# Patient Record
Sex: FEMALE | Race: WHITE | Employment: OTHER | URBAN - METROPOLITAN AREA
[De-identification: names, ages, dates, MRNs, and addresses within clinical notes are randomized per-mention and may not be internally consistent; named-entity substitution may affect disease eponyms.]

---

## 2017-02-14 ENCOUNTER — TELEPHONE (OUTPATIENT)
Dept: SURGERY | Age: 57
End: 2017-02-14

## 2017-02-14 NOTE — TELEPHONE ENCOUNTER
Per [facility] 30 day post surgical requirements:  Phone call placed. Spoke to patient. Patient denies any readmissions or complications within the 30 day post operative period.

## 2017-02-15 ENCOUNTER — OFFICE VISIT (OUTPATIENT)
Dept: SURGERY | Age: 57
End: 2017-02-15

## 2017-02-15 VITALS
HEART RATE: 68 BPM | HEIGHT: 60 IN | RESPIRATION RATE: 16 BRPM | WEIGHT: 142 LBS | OXYGEN SATURATION: 98 % | DIASTOLIC BLOOD PRESSURE: 96 MMHG | SYSTOLIC BLOOD PRESSURE: 141 MMHG | BODY MASS INDEX: 27.88 KG/M2

## 2017-02-15 DIAGNOSIS — Z98.84 S/P BARIATRIC SURGERY: Primary | ICD-10-CM

## 2017-02-15 NOTE — MR AVS SNAPSHOT
Visit Information Date & Time Provider Department Dept. Phone Encounter #  
 2/15/2017 11:30 AM Helen Goldman [facility] [place] [phone] Upcoming Health Maintenance Date Due Hepatitis C Screening 1960 Pneumococcal 19-64 Medium Risk (1 of 1 - PPSV23) 12/17/1980 DTaP/Tdap/Td series (1 - Tdap) 12/17/1982 PAP AKA CERVICAL CYTOLOGY 12/17/1982 BREAST CANCER SCRN MAMMOGRAM 12/17/2011 FOBT Q 1 YEAR AGE 50-75 12/17/2011 Allergies as of 2/15/2017  Review Complete On: 2/15/2017 By: Mike Ritchie MD  
  
 Severity Noted Reaction Type Reactions Claritin [Loratadine]  07/31/2014    Other (comments)  
 dizzy Codeine  07/31/2014    Nausea Only Current Immunizations  Reviewed on 12/4/2016 Name Date Influenza Vaccine 9/1/2016 Not reviewed this visit Vitals BP Pulse Resp Height(growth percentile) Weight(growth percentile) LMP  
 (!) 141/96 (BP 1 Location: Left arm, BP Patient Position: Sitting) 68 16 5' (1.524 m) 142 lb (64.4 kg) 06/09/2014 SpO2 BMI OB Status Smoking Status 98% 27.73 kg/m2 Postmenopausal Never Smoker Vitals History BMI and BSA Data Body Mass Index Body Surface Area  
 27.73 kg/m 2 1.65 m 2 Your Updated Medication List  
  
   
This list is accurate as of: 2/15/17 11:53 AM.  Always use your most recent med list. ALLEGRA 180 mg tablet Generic drug:  fexofenadine Take  by mouth daily. B COMPLEX PO Take  by mouth. BIOTIN PO Take  by mouth. CALCIUM CARBONATE PO Take  by mouth. COUMADIN PO Take  by mouth. DUONEB 2.5 mg-0.5 mg/3 ml Nebu Generic drug:  albuterol-ipratropium 3 mL by Nebulization route once. FISH OIL PO Take  by mouth. FLONASE 50 mcg/actuation nasal spray Generic drug:  fluticasone  
nightly. FOSAMAX 70 mg tablet Generic drug:  alendronate Take  by mouth. LASIX PO Take  by mouth. METAMUCIL PO Take  by mouth.  
  
 midodrine 5 mg tablet Commonly known as:  Yosef Abo Take 5 mg by mouth daily. Takes in the morning. MIRALAX PO Take  by mouth.  
  
 multivitamin tablet Commonly known as:  ONE A DAY Take 1 Tab by mouth daily. OXYGEN-AIR DELIVERY SYSTEMS  
by Does Not Apply route. peg 400-propylene glycol 0.4-0.3 % Drop Commonly known as:  SYSTANE Administer  to left eye as needed. POTASSIUM CHLORIDE PO Take  by mouth.  
  
 predniSONE 10 mg tablet Commonly known as:  Juanis Tammy Take  by mouth daily (with breakfast). PULMICORT 0.5 mg/2 mL Nbsp Generic drug:  budesonide 500 mcg by Nebulization route two (2) times a day. THEOPHYLLINE PO Take  by mouth. traMADol 50 mg tablet Commonly known as:  ULTRAM  
Take 50 mg by mouth every four (4) hours as needed for Pain. ZAROXOLYN 2.5 mg tablet Generic drug:  metOLazone Take  by mouth daily. Patient Instructions Body Mass Index: Care Instructions Your Care Instructions Body mass index (BMI) can help you see if your weight is raising your risk for health problems. It uses a formula to compare how much you weigh with how tall you are. A BMI between 18.5 and 24.9 is considered healthy. A BMI between 25 and 29.9 is considered overweight. A BMI of 30 or higher is considered obese. If your BMI is in the normal range, it means that you have a lower risk for weight-related health problems. If your BMI is in the overweight or obese range, you may be at increased risk for weight-related health problems, such as high blood pressure, heart disease, stroke, arthritis or joint pain, and diabetes. BMI is just one measure of your risk for weight-related health problems.  You may be at higher risk for health problems if you are not active, you eat an unhealthy diet, or you drink too much alcohol or use tobacco products. Follow-up care is a key part of your treatment and safety. Be sure to make and go to all appointments, and call your doctor if you are having problems. It's also a good idea to know your test results and keep a list of the medicines you take. How can you care for yourself at home? · Practice healthy eating habits. This includes eating plenty of fruits, vegetables, whole grains, lean protein, and low-fat dairy. · Get at least 30 minutes of exercise 5 days a week or more. Brisk walking is a good choice. You also may want to do other activities, such as running, swimming, cycling, or playing tennis or team sports. · Do not smoke. Smoking can increase your risk for health problems. If you need help quitting, talk to your doctor about stop-smoking programs and medicines. These can increase your chances of quitting for good. · Limit alcohol to 2 drinks a day for men and 1 drink a day for women. Too much alcohol can cause health problems. If you have a BMI higher than 25 · Your doctor may do other tests to check your risk for weight-related health problems. This may include measuring the distance around your waist. A waist measurement of more than 40 inches in men or 35 inches in women can increase the risk of weight-related health problems. · Talk with your doctor about steps you can take to stay healthy or improve your health. You may need to make lifestyle changes to lose weight and stay healthy, such as changing your diet and getting regular exercise. Where can you learn more? Go to http://miguel-tiffany.info/. Enter S176 in the search box to learn more about \"Body Mass Index: Care Instructions. \" Current as of: February 16, 2016 Content Version: 11.1 © 5411-7340 [employer]. Care instructions adapted under license by [employer] (which disclaims liability or warranty for this information).  If you have questions about a medical condition or this instruction, always ask your healthcare professional. [address] any warranty or liability for your use of this information. Introducing [facility]! Dear Beth Iniguez: Thank you for requesting a Springbot account. Our records indicate that you already have an active Springbot account. You can access your account anytime at [email or web address]. [employer]otiant/[email or web address] After Fifty Did you know that you can access your hospital and ER discharge instructions at any time in Springbot? You can also review all of your test results from your hospital stay or ER visit. Additional Information If you have questions, please visit the Frequently Asked Questions section of the Springbot website at [email or web address]. [employer]/[email or web address] After [email or web address]/. Remember, Springbot is NOT to be used for urgent needs. For medical emergencies, dial 911. Now available from your iPhone and Android! Please provide this summary of care documentation to your next provider. Your primary care clinician is listed as Fox Maxwell. If you have any questions after today's visit, please call [phone].

## 2017-02-15 NOTE — PROGRESS NOTES
Subjective:     Pooja Gayle  is a 55 y.o. female who presents for follow-up about 2 months following Lap band removal..  Body mass index is 27.73 kg/(m^2). .    Surgery related complication: none       She reports pain at port site only and denies vomiting. Patients pain score:0      The patient's exercise level: moderately active. Changes in her medical history and medications have been reviewed. Patient Active Problem List   Diagnosis Code    Morbid obesity ([address] E66.01    Unspecified sleep apnea G47.30    Female stress incontinence N39.3    Asthma J45.909    Small bowel obstruction ([address] K56.69    Acute on chronic respiratory failure with hypoxia ([address] J96.21     Past Medical History   Diagnosis Date    Asthma 11/7/2011    Female stress incontinence 11/7/2011    Morbid obesity ([facility] [address] 11/7/2011    Unspecified sleep apnea 11/7/2011     Past Surgical History   Procedure Laterality Date    Pr lap, place adjust gastr band  6/12/2008    Pr tracheostomy, planned  1987     1)emergency   2)permanent trach    Pr neurological procedure unlisted  1987     brain stem relocation    Hx hernia repair      Hx gi  12/2016     removal of lap band and port     Current Outpatient Prescriptions   Medication Sig Dispense Refill    budesonide (PULMICORT) 0.5 mg/2 mL nbsp 500 mcg by Nebulization route two (2) times a day.  fluticasone (FLONASE) 50 mcg/actuation nasal spray nightly.  fexofenadine (ALLEGRA) 180 mg tablet Take  by mouth daily.  CALCIUM CARBONATE PO Take  by mouth.  BIOTIN PO Take  by mouth.  multivitamin (ONE A DAY) tablet Take 1 Tab by mouth daily.  VITAMIN B COMPLEX (B COMPLEX PO) Take  by mouth.  PSYLLIUM SEED, WITH SUGAR, (METAMUCIL PO) Take  by mouth.  POLYETHYLENE GLYCOL 3350 (MIRALAX PO) Take  by mouth.  peg 400-propylene glycol (SYSTANE) 0.4-0.3 % drop Administer  to left eye as needed.  OXYGEN-AIR DELIVERY SYSTEMS by Does Not Apply route.  THEOPHYLLINE ANHYDROUS (THEOPHYLLINE PO) Take  by mouth.  WARFARIN SODIUM (COUMADIN PO) Take  by mouth.  midodrine (PROAMITINE) 5 mg tablet Take 5 mg by mouth daily. Takes in the morning.  alendronate (FOSAMAX) 70 mg tablet Take  by mouth.  traMADol (ULTRAM) 50 mg tablet Take 50 mg by mouth every four (4) hours as needed for Pain.  DOCOSAHEXANOIC ACID/EPA (FISH OIL PO) Take  by mouth.  POTASSIUM CHLORIDE PO Take  by mouth.  metolazone (ZAROXOLYN) 2.5 mg tablet Take  by mouth daily.  FUROSEMIDE (LASIX PO) Take  by mouth.  predniSONE (DELTASONE) 10 mg tablet Take  by mouth daily (with breakfast).  albuterol-ipratropium (DUONEB) 0.5 mg-3 mg(2.5 mg base)/3 mL nebulizer solution 3 mL by Nebulization route once. Objective:     Visit Vitals    BP (!) 141/96 (BP 1 Location: Left arm, BP Patient Position: Sitting)    Pulse 68    Resp 16    Ht 5' (1.524 m)    Wt 64.4 kg (142 lb)    LMP 06/09/2014    SpO2 98%    BMI 27.73 kg/m2        Physical Exam:    General:  alert, cooperative, no distress, appears stated age   Lungs:   clear to auscultation bilaterally   Heart:  Regular rate and rhythm   Abdomen:   abdomen is soft without significant tenderness, masses, organomegaly or guarding; Incisions: healing well, port in place         Assessment:     1. History of Morbid obesity, status post  lap band removal. Will need port to be removed. Plan:     1. Remember to measure portions, continue low carbohydrate diet  2. Tolerated transition to solids without issue  3. Remember vitamin supplements. 4. Exercise regimen appears adequate. 5. Attend support group. 6. Follow-up 6 months. 7. Total time spent with the patient 20 minutes.   8. Will schedule for lap band port removal.

## 2017-02-15 NOTE — PATIENT INSTRUCTIONS
Body Mass Index: Care Instructions  Your Care Instructions    Body mass index (BMI) can help you see if your weight is raising your risk for health problems. It uses a formula to compare how much you weigh with how tall you are. A BMI between 18.5 and 24.9 is considered healthy. A BMI between 25 and 29.9 is considered overweight. A BMI of 30 or higher is considered obese. If your BMI is in the normal range, it means that you have a lower risk for weight-related health problems. If your BMI is in the overweight or obese range, you may be at increased risk for weight-related health problems, such as high blood pressure, heart disease, stroke, arthritis or joint pain, and diabetes. BMI is just one measure of your risk for weight-related health problems. You may be at higher risk for health problems if you are not active, you eat an unhealthy diet, or you drink too much alcohol or use tobacco products. Follow-up care is a key part of your treatment and safety. Be sure to make and go to all appointments, and call your doctor if you are having problems. It's also a good idea to know your test results and keep a list of the medicines you take. How can you care for yourself at home? · Practice healthy eating habits. This includes eating plenty of fruits, vegetables, whole grains, lean protein, and low-fat dairy. · Get at least 30 minutes of exercise 5 days a week or more. Brisk walking is a good choice. You also may want to do other activities, such as running, swimming, cycling, or playing tennis or team sports. · Do not smoke. Smoking can increase your risk for health problems. If you need help quitting, talk to your doctor about stop-smoking programs and medicines. These can increase your chances of quitting for good. · Limit alcohol to 2 drinks a day for men and 1 drink a day for women. Too much alcohol can cause health problems.   If you have a BMI higher than 25  · Your doctor may do other tests to check your risk for weight-related health problems. This may include measuring the distance around your waist. A waist measurement of more than 40 inches in men or 35 inches in women can increase the risk of weight-related health problems. · Talk with your doctor about steps you can take to stay healthy or improve your health. You may need to make lifestyle changes to lose weight and stay healthy, such as changing your diet and getting regular exercise. Where can you learn more? Go to http://miguel-tiffany.info/. Enter S176 in the search box to learn more about \"Body Mass Index: Care Instructions. \"  Current as of: February 16, 2016  Content Version: 11.1  © 4946-6665 [employer]. Care instructions adapted under license by [employer] (which disclaims liability or warranty for this information). If you have questions about a medical condition or this instruction, always ask your healthcare professional. [address] any warranty or liability for your use of this information.

## 2017-03-10 DIAGNOSIS — Z98.84 BARIATRIC SURGERY STATUS: ICD-10-CM

## 2017-03-10 DIAGNOSIS — T85.518A: Primary | ICD-10-CM

## 2017-03-10 DIAGNOSIS — Z01.812 BLOOD TESTS PRIOR TO TREATMENT OR PROCEDURE: ICD-10-CM

## 2017-03-28 ENCOUNTER — HOSPITAL ENCOUNTER (OUTPATIENT)
Dept: PREADMISSION TESTING | Age: 57
Discharge: HOME | End: 2017-03-28
Attending: SPECIALIST
Payer: COMMERCIAL

## 2017-03-28 DIAGNOSIS — Z98.84 BARIATRIC SURGERY STATUS: ICD-10-CM

## 2017-03-28 DIAGNOSIS — Z01.812 BLOOD TESTS PRIOR TO TREATMENT OR PROCEDURE: ICD-10-CM

## 2017-03-28 DIAGNOSIS — T85.518A: ICD-10-CM

## 2017-03-28 LAB
ALBUMIN SERPL BCP-MCNC: 3.4 G/DL (ref 3.4–5)
ALBUMIN/GLOB SERPL: 1 {RATIO} (ref 0.8–1.7)
ALP SERPL-CCNC: 48 U/L (ref 45–117)
ALT SERPL-CCNC: 37 U/L (ref 13–56)
ANION GAP BLD CALC-SCNC: 5 MMOL/L (ref 3–18)
AST SERPL W P-5'-P-CCNC: 25 U/L (ref 15–37)
BASOPHILS # BLD AUTO: 0.1 K/UL (ref 0–0.06)
BASOPHILS # BLD: 1 % (ref 0–2)
BILIRUB SERPL-MCNC: 0.3 MG/DL (ref 0.2–1)
BUN SERPL-MCNC: 23 MG/DL (ref 7–18)
BUN/CREAT SERPL: 37 (ref 12–20)
CALCIUM SERPL-MCNC: 9.2 MG/DL (ref 8.5–10.1)
CHLORIDE SERPL-SCNC: 107 MMOL/L (ref 100–108)
CO2 SERPL-SCNC: 33 MMOL/L (ref 21–32)
CREAT SERPL-MCNC: 0.63 MG/DL (ref 0.6–1.3)
DIFFERENTIAL METHOD BLD: ABNORMAL
EOSINOPHIL # BLD: 0.4 K/UL (ref 0–0.4)
EOSINOPHIL NFR BLD: 7 % (ref 0–5)
ERYTHROCYTE [DISTWIDTH] IN BLOOD BY AUTOMATED COUNT: 14.9 % (ref 11.6–14.5)
GLOBULIN SER CALC-MCNC: 3.3 G/DL (ref 2–4)
GLUCOSE SERPL-MCNC: 86 MG/DL (ref 74–99)
HCT VFR BLD AUTO: 40.6 % (ref 35–45)
HGB BLD-MCNC: 13.2 G/DL (ref 12–16)
LYMPHOCYTES # BLD AUTO: 21 % (ref 21–52)
LYMPHOCYTES # BLD: 1.3 K/UL (ref 0.9–3.6)
MCH RBC QN AUTO: 29.1 PG (ref 24–34)
MCHC RBC AUTO-ENTMCNC: 32.5 G/DL (ref 31–37)
MCV RBC AUTO: 89.6 FL (ref 74–97)
MONOCYTES # BLD: 0.5 K/UL (ref 0.05–1.2)
MONOCYTES NFR BLD AUTO: 7 % (ref 3–10)
NEUTS SEG # BLD: 4 K/UL (ref 1.8–8)
NEUTS SEG NFR BLD AUTO: 64 % (ref 40–73)
PLATELET # BLD AUTO: 239 K/UL (ref 135–420)
PMV BLD AUTO: 10.4 FL (ref 9.2–11.8)
POTASSIUM SERPL-SCNC: 4.2 MMOL/L (ref 3.5–5.5)
PROT SERPL-MCNC: 6.7 G/DL (ref 6.4–8.2)
RBC # BLD AUTO: 4.53 M/UL (ref 4.2–5.3)
SODIUM SERPL-SCNC: 145 MMOL/L (ref 136–145)
WBC # BLD AUTO: 6.2 K/UL (ref 4.6–13.2)

## 2017-03-28 PROCEDURE — 80053 COMPREHEN METABOLIC PANEL: CPT | Performed by: SPECIALIST

## 2017-03-28 PROCEDURE — 93005 ELECTROCARDIOGRAM TRACING: CPT

## 2017-03-28 PROCEDURE — 85025 COMPLETE CBC W/AUTO DIFF WBC: CPT | Performed by: SPECIALIST

## 2017-03-28 PROCEDURE — 36415 COLL VENOUS BLD VENIPUNCTURE: CPT | Performed by: SPECIALIST

## 2017-03-29 LAB
ATRIAL RATE: 65 BPM
CALCULATED P AXIS, ECG09: 72 DEGREES
CALCULATED R AXIS, ECG10: 50 DEGREES
CALCULATED T AXIS, ECG11: 55 DEGREES
DIAGNOSIS, 93000: NORMAL
P-R INTERVAL, ECG05: 144 MS
Q-T INTERVAL, ECG07: 434 MS
QRS DURATION, ECG06: 138 MS
QTC CALCULATION (BEZET), ECG08: 451 MS
VENTRICULAR RATE, ECG03: 65 BPM

## 2017-04-21 ENCOUNTER — ANESTHESIA (OUTPATIENT)
Dept: SURGERY | Age: 57
End: 2017-04-21
Payer: COMMERCIAL

## 2017-04-21 ENCOUNTER — ANESTHESIA EVENT (OUTPATIENT)
Dept: SURGERY | Age: 57
End: 2017-04-21
Payer: COMMERCIAL

## 2017-04-21 ENCOUNTER — HOSPITAL ENCOUNTER (OUTPATIENT)
Age: 57
Setting detail: OUTPATIENT SURGERY
Discharge: HOME | End: 2017-04-21
Attending: SPECIALIST | Admitting: SPECIALIST
Payer: COMMERCIAL

## 2017-04-21 VITALS
TEMPERATURE: 97.8 F | DIASTOLIC BLOOD PRESSURE: 90 MMHG | HEIGHT: 60 IN | RESPIRATION RATE: 14 BRPM | BODY MASS INDEX: 28.07 KG/M2 | WEIGHT: 143 LBS | OXYGEN SATURATION: 96 % | SYSTOLIC BLOOD PRESSURE: 161 MMHG | HEART RATE: 76 BPM

## 2017-04-21 LAB
INR PPP: 1.1 (ref 0.8–1.2)
PROTHROMBIN TIME: 13.7 SEC (ref 11.5–15.2)

## 2017-04-21 PROCEDURE — 85610 PROTHROMBIN TIME: CPT | Performed by: ANESTHESIOLOGY

## 2017-04-21 PROCEDURE — 74011250636 HC RX REV CODE- 250/636: Performed by: SPECIALIST

## 2017-04-21 PROCEDURE — 77030032490 HC SLV COMPR SCD KNE COVD -B: Performed by: SPECIALIST

## 2017-04-21 PROCEDURE — 74011000250 HC RX REV CODE- 250

## 2017-04-21 PROCEDURE — 77030011640 HC PAD GRND REM COVD -A: Performed by: SPECIALIST

## 2017-04-21 PROCEDURE — 77030020782 HC GWN BAIR PAWS FLX 3M -B: Performed by: SPECIALIST

## 2017-04-21 PROCEDURE — 74011250636 HC RX REV CODE- 250/636

## 2017-04-21 PROCEDURE — 88300 SURGICAL PATH GROSS: CPT | Performed by: SPECIALIST

## 2017-04-21 PROCEDURE — 76210000021 HC REC RM PH II 0.5 TO 1 HR: Performed by: SPECIALIST

## 2017-04-21 PROCEDURE — 76060000032 HC ANESTHESIA 0.5 TO 1 HR: Performed by: SPECIALIST

## 2017-04-21 PROCEDURE — 77030018836 HC SOL IRR NACL ICUM -A: Performed by: SPECIALIST

## 2017-04-21 PROCEDURE — 77030002935 HC SUT MCRYL J&J -C: Performed by: SPECIALIST

## 2017-04-21 PROCEDURE — 76010000138 HC OR TIME 0.5 TO 1 HR: Performed by: SPECIALIST

## 2017-04-21 PROCEDURE — 74011000250 HC RX REV CODE- 250: Performed by: SPECIALIST

## 2017-04-21 PROCEDURE — 36415 COLL VENOUS BLD VENIPUNCTURE: CPT | Performed by: ANESTHESIOLOGY

## 2017-04-21 RX ORDER — MIDAZOLAM HYDROCHLORIDE 1 MG/ML
INJECTION, SOLUTION INTRAMUSCULAR; INTRAVENOUS AS NEEDED
Status: DISCONTINUED | OUTPATIENT
Start: 2017-04-21 | End: 2017-04-21 | Stop reason: HOSPADM

## 2017-04-21 RX ORDER — SODIUM CHLORIDE, SODIUM LACTATE, POTASSIUM CHLORIDE, CALCIUM CHLORIDE 600; 310; 30; 20 MG/100ML; MG/100ML; MG/100ML; MG/100ML
125 INJECTION, SOLUTION INTRAVENOUS CONTINUOUS
Status: DISCONTINUED | OUTPATIENT
Start: 2017-04-21 | End: 2017-04-21 | Stop reason: HOSPADM

## 2017-04-21 RX ORDER — NALOXONE HYDROCHLORIDE 0.4 MG/ML
0.1 INJECTION, SOLUTION INTRAMUSCULAR; INTRAVENOUS; SUBCUTANEOUS AS NEEDED
Status: DISCONTINUED | OUTPATIENT
Start: 2017-04-21 | End: 2017-04-21 | Stop reason: HOSPADM

## 2017-04-21 RX ORDER — SODIUM CHLORIDE, SODIUM LACTATE, POTASSIUM CHLORIDE, CALCIUM CHLORIDE 600; 310; 30; 20 MG/100ML; MG/100ML; MG/100ML; MG/100ML
100 INJECTION, SOLUTION INTRAVENOUS CONTINUOUS
Status: DISCONTINUED | OUTPATIENT
Start: 2017-04-21 | End: 2017-04-21

## 2017-04-21 RX ORDER — FLUMAZENIL 0.1 MG/ML
0.2 INJECTION INTRAVENOUS
Status: DISCONTINUED | OUTPATIENT
Start: 2017-04-21 | End: 2017-04-21 | Stop reason: HOSPADM

## 2017-04-21 RX ORDER — LIDOCAINE HYDROCHLORIDE 20 MG/ML
INJECTION, SOLUTION EPIDURAL; INFILTRATION; INTRACAUDAL; PERINEURAL AS NEEDED
Status: DISCONTINUED | OUTPATIENT
Start: 2017-04-21 | End: 2017-04-21 | Stop reason: HOSPADM

## 2017-04-21 RX ORDER — MAGNESIUM SULFATE 100 %
4 CRYSTALS MISCELLANEOUS AS NEEDED
Status: DISCONTINUED | OUTPATIENT
Start: 2017-04-21 | End: 2017-04-21 | Stop reason: HOSPADM

## 2017-04-21 RX ORDER — HYDROMORPHONE HYDROCHLORIDE 1 MG/ML
0.5 INJECTION, SOLUTION INTRAMUSCULAR; INTRAVENOUS; SUBCUTANEOUS
Status: DISCONTINUED | OUTPATIENT
Start: 2017-04-21 | End: 2017-04-21 | Stop reason: HOSPADM

## 2017-04-21 RX ORDER — FENTANYL CITRATE 50 UG/ML
INJECTION, SOLUTION INTRAMUSCULAR; INTRAVENOUS AS NEEDED
Status: DISCONTINUED | OUTPATIENT
Start: 2017-04-21 | End: 2017-04-21 | Stop reason: HOSPADM

## 2017-04-21 RX ORDER — OXYCODONE AND ACETAMINOPHEN 5; 325 MG/1; MG/1
1 TABLET ORAL
Qty: 10 TAB | Refills: 0 | Status: SHIPPED | OUTPATIENT
Start: 2017-04-21 | End: 2017-05-05

## 2017-04-21 RX ORDER — SODIUM CHLORIDE, SODIUM LACTATE, POTASSIUM CHLORIDE, CALCIUM CHLORIDE 600; 310; 30; 20 MG/100ML; MG/100ML; MG/100ML; MG/100ML
1000 INJECTION, SOLUTION INTRAVENOUS CONTINUOUS
Status: DISCONTINUED | OUTPATIENT
Start: 2017-04-21 | End: 2017-04-21 | Stop reason: HOSPADM

## 2017-04-21 RX ORDER — DEXTROSE 50 % IN WATER (D50W) INTRAVENOUS SYRINGE
25-50 AS NEEDED
Status: DISCONTINUED | OUTPATIENT
Start: 2017-04-21 | End: 2017-04-21 | Stop reason: HOSPADM

## 2017-04-21 RX ORDER — INSULIN LISPRO 100 [IU]/ML
INJECTION, SOLUTION INTRAVENOUS; SUBCUTANEOUS ONCE
Status: DISCONTINUED | OUTPATIENT
Start: 2017-04-21 | End: 2017-04-21 | Stop reason: HOSPADM

## 2017-04-21 RX ORDER — CEFAZOLIN SODIUM IN 0.9 % NACL 2 G/100 ML
2 PLASTIC BAG, INJECTION (ML) INTRAVENOUS ONCE
Status: COMPLETED | OUTPATIENT
Start: 2017-04-21 | End: 2017-04-21

## 2017-04-21 RX ORDER — ACETAMINOPHEN 10 MG/ML
1000 INJECTION, SOLUTION INTRAVENOUS ONCE
Status: COMPLETED | OUTPATIENT
Start: 2017-04-21 | End: 2017-04-21

## 2017-04-21 RX ORDER — SODIUM CHLORIDE 0.9 % (FLUSH) 0.9 %
5-10 SYRINGE (ML) INJECTION AS NEEDED
Status: DISCONTINUED | OUTPATIENT
Start: 2017-04-21 | End: 2017-04-21 | Stop reason: HOSPADM

## 2017-04-21 RX ORDER — PROPOFOL 10 MG/ML
INJECTION, EMULSION INTRAVENOUS AS NEEDED
Status: DISCONTINUED | OUTPATIENT
Start: 2017-04-21 | End: 2017-04-21 | Stop reason: HOSPADM

## 2017-04-21 RX ADMIN — PROPOFOL 10 MG: 10 INJECTION, EMULSION INTRAVENOUS at 10:55

## 2017-04-21 RX ADMIN — MIDAZOLAM HYDROCHLORIDE 2 MG: 1 INJECTION, SOLUTION INTRAMUSCULAR; INTRAVENOUS at 10:38

## 2017-04-21 RX ADMIN — FENTANYL CITRATE 100 MCG: 50 INJECTION, SOLUTION INTRAMUSCULAR; INTRAVENOUS at 10:38

## 2017-04-21 RX ADMIN — PROPOFOL 10 MG: 10 INJECTION, EMULSION INTRAVENOUS at 11:04

## 2017-04-21 RX ADMIN — PROPOFOL 10 MG: 10 INJECTION, EMULSION INTRAVENOUS at 10:51

## 2017-04-21 RX ADMIN — CEFAZOLIN 2 G: 10 INJECTION, POWDER, FOR SOLUTION INTRAVENOUS; PARENTERAL at 10:38

## 2017-04-21 RX ADMIN — SODIUM CHLORIDE, SODIUM LACTATE, POTASSIUM CHLORIDE, AND CALCIUM CHLORIDE 125 ML/HR: 600; 310; 30; 20 INJECTION, SOLUTION INTRAVENOUS at 09:12

## 2017-04-21 RX ADMIN — LIDOCAINE HYDROCHLORIDE 60 MG: 20 INJECTION, SOLUTION EPIDURAL; INFILTRATION; INTRACAUDAL; PERINEURAL at 10:51

## 2017-04-21 RX ADMIN — ACETAMINOPHEN 1000 MG: 10 INJECTION, SOLUTION INTRAVENOUS at 10:48

## 2017-04-21 NOTE — ANESTHESIA PREPROCEDURE EVALUATION
Anesthetic History   No history of anesthetic complications            Review of Systems / Medical History  Patient summary reviewed, nursing notes reviewed and pertinent labs reviewed    Pulmonary    COPD: severe    Sleep apnea: CPAP        Comments: Patient with permanent trach #4 shiley cuffed but deflated. She said it has a slow leak. She uses vent and 2 liters of o2 at night.     Neuro/Psych   Within defined limits           Cardiovascular    Hypertension              Exercise tolerance: >4 METS     GI/Hepatic/Renal  Within defined limits              Endo/Other  Within defined limits      Obesity and morbid obesity     Other Findings              Physical Exam    Airway  Mallampati: II  TM Distance: 4 - 6 cm  Neck ROM: normal range of motion   Mouth opening: Normal  Tracheostomy present   Cardiovascular               Dental  No notable dental hx    Comments: overbite   Pulmonary                 Abdominal  GI exam deferred       Other Findings            Anesthetic Plan    ASA: 3  Anesthesia type: MAC          Induction: Intravenous  Anesthetic plan and risks discussed with: Patient

## 2017-04-21 NOTE — DISCHARGE INSTRUCTIONS
DISCHARGE SUMMARY from Nurse    The following personal items are in your possession at time of discharge:    Dental Appliances: None        Home Medications: None  Jewelry: None  Clothing: Undergarments, Sweater, Footwear, Shirt, Pants (in locker #11)  Other Valuables: Eyeglasses, [employer], [address], Cell Phone, Other (comment) (and hearing aids with Gilberto Forrest)             PATIENT INSTRUCTIONS:    After general anesthesia or intravenous sedation, for 24 hours or while taking prescription Narcotics:  · Limit your activities  · Do not drive and operate hazardous machinery  · Do not make important personal or business decisions  · Do  not drink alcoholic beverages  · If you have not urinated within 8 hours after discharge, please contact your surgeon on call. Report the following to your surgeon:  · Excessive pain, swelling, redness or odor of or around the surgical area  · Temperature over 100.5  · Nausea and vomiting lasting longer than 4 hours or if unable to take medications  · Any signs of decreased circulation or nerve impairment to extremity: change in color, persistent  numbness, tingling, coldness or increase pain  · Any questions        What to do at Home:  Resume diet  Return to office as scheduled call for appt  Please follow attached instruction sheet    If you experience any of the following symptoms heavy bleeding, fevers, severe pain, please follow up with dr Angie German. *  Please give a list of your current medications to your Primary Care Provider. *  Please update this list whenever your medications are discontinued, doses are      changed, or new medications (including over-the-counter products) are added. *  Please carry medication information at all times in case of emergency situations.           These are general instructions for a healthy lifestyle:    No smoking/ No tobacco products/ Avoid exposure to second hand smoke    Surgeon General's Warning:  Quitting smoking now greatly reduces serious risk to your health. Obesity, smoking, and sedentary lifestyle greatly increases your risk for illness    A healthy diet, regular physical exercise & weight monitoring are important for maintaining a healthy lifestyle    You may be retaining fluid if you have a history of heart failure or if you experience any of the following symptoms:  Weight gain of 3 pounds or more overnight or 5 pounds in a week, increased swelling in our hands or feet or shortness of breath while lying flat in bed. Please call your doctor as soon as you notice any of these symptoms; do not wait until your next office visit. Recognize signs and symptoms of STROKE:    F-face looks uneven    A-arms unable to move or move unevenly    S-speech slurred or non-existent    T-time-call 911 as soon as signs and symptoms begin-DO NOT go       Back to bed or wait to see if you get better-TIME IS BRAIN. Warning Signs of HEART ATTACK     Call 911 if you have these symptoms:   Chest discomfort. Most heart attacks involve discomfort in the center of the chest that lasts more than a few minutes, or that goes away and comes back. It can feel like uncomfortable pressure, squeezing, fullness, or pain.  Discomfort in other areas of the upper body. Symptoms can include pain or discomfort in one or both arms, the back, neck, jaw, or stomach.  Shortness of breath with or without chest discomfort.  Other signs may include breaking out in a cold sweat, nausea, or lightheadedness. Don't wait more than five minutes to call 911 - MINUTES MATTER! Fast action can save your life. Calling 911 is almost always the fastest way to get lifesaving treatment. Emergency Medical Services staff can begin treatment when they arrive -- up to an hour sooner than if someone gets to the hospital by car. The discharge information has been reviewed with the patient and caregiver. The patient and caregiver verbalized understanding.     Discharge medications reviewed with the patient and caregiver and appropriate educational materials and side effects teaching were provided.     Patient armband removed and shredded

## 2017-04-21 NOTE — PERIOP NOTES
Reviewed discharge plan of care with patient and her . They verbalized understanding.  Also provided Dr Clay Lincoln post operative handout ( which is not visible in EMR)

## 2017-04-21 NOTE — IP AVS SNAPSHOT
Nicole Montgomery 
 
 
 [address] [place] [id number] Patient: Sofi Ayala MRN: [id number] :1960 You are allergic to the following Allergen Reactions Claritin (Loratadine) Other (comments)  
 dizzy Codeine Nausea Only Zyrtec (Cetirizine) Other (comments)  
 dizzy Recent Documentation Height Weight BMI OB Status Smoking Status 1.524 m 64.9 kg 27.93 kg/m2 Postmenopausal Never Smoker Emergency Contacts Name Discharge Info Relation Home Work Mobile Janette CAREGIVER [3] Spouse [3] [phone] Mimi العلي  Other Relative [6] [phone] About your hospitalization You were admitted on:  2017 [facility] You were discharged on:  2017 Unit phone number:    
  
Why you were hospitalized Your primary diagnosis was:  Not on File Providers Seen During Your Hospitalizations Provider Role Specialty Primary office phone William Cunningham MD Attending Provider General Surgery [phone] Your Primary Care Physician (PCP) Primary Care Physician Office Phone Office Fax [facility] [phone] Follow-up Information Follow up With Details Comments Contact Info Garland Kaminski MD   [address] [place] [address] 
[phone] Your Appointments Friday May 05, 2017 10:30 AM EDT  
POST OP with Baudilio Alarcon NP [facility] Madeleine Sandoval ([address])  
 [address] [place] [address] [address] [place] [address]  
[phone] Current Discharge Medication List  
  
START taking these medications Dose & Instructions Dispensing Information Comments Morning Noon Evening Bedtime  
 oxyCODONE-acetaminophen 5-325 mg per tablet Commonly known as:  PERCOCET Your last dose was: Your next dose is:    
   
   
 Dose:  1 Tab Take 1 Tab by mouth every six (6) hours as needed for Pain. Max Daily Amount: 4 Tabs. Quantity:  10 Tab Refills:  0 CONTINUE these medications which have NOT CHANGED Dose & Instructions Dispensing Information Comments Morning Noon Evening Bedtime ALLEGRA 180 mg tablet Generic drug:  fexofenadine Your last dose was: Your next dose is: Take  by mouth daily. Refills:  0  
     
   
   
   
  
 B COMPLEX PO Your last dose was: Your next dose is: Take  by mouth. Refills:  0  
     
   
   
   
  
 BIOTIN PO Your last dose was: Your next dose is:    
   
   
 Dose:  86427 mcg Take 10,000 mcg by mouth daily. Refills:  0  
     
   
   
   
  
 CALCIUM CARBONATE PO Your last dose was: Your next dose is:    
   
   
 Dose:  600 mg Take 600 mg by mouth two (2) times a day. Refills:  0  
     
   
   
   
  
 * warfarin 7.5 mg tablet Commonly known as:  COUMADIN Your last dose was: Your next dose is:    
   
   
 Dose:  7.5 mg Take 7.5 mg by mouth four (4) days a week. Refills:  0  
     
   
   
   
  
 * COUMADIN PO Your last dose was: Your next dose is:    
   
   
 Dose:  5 mg Take 5 mg by mouth Q TU, TH & SAT. Refills:  0  
     
   
   
   
  
 FLONASE 50 mcg/actuation nasal spray Generic drug:  fluticasone Your last dose was: Your next dose is:    
   
   
 Dose:  2 Spray 2 Sprays by Both Nostrils route daily. Refills:  0  
     
   
   
   
  
 FOSAMAX 70 mg tablet Generic drug:  alendronate Your last dose was: Your next dose is:    
   
   
 Dose:  70 mg Take 70 mg by mouth every seven (7) days. Refills:  0 METAMUCIL PO Your last dose was: Your next dose is: Take  by mouth. Refills:  0 MIRALAX PO Your last dose was: Your next dose is: Take  by mouth. Refills:  0  
     
   
   
   
  
 multivitamin tablet Commonly known as:  ONE A DAY Your last dose was: Your next dose is:    
   
   
 Dose:  1 Tab Take 1 Tab by mouth daily. Refills:  0 OXYGEN-AIR DELIVERY SYSTEMS Your last dose was: Your next dose is:    
   
   
 by Does Not Apply route. 2 L O2 fed into ventilator uses at night Refills:  0  
     
   
   
   
  
 peg 400-propylene glycol 0.4-0.3 % Drop Commonly known as:  SYSTANE Your last dose was: Your next dose is:    
   
   
 Dose:  2 Drop Administer 2 Drops to both eyes daily. Refills:  0 PULMICORT 0.5 mg/2 mL Nbsp Generic drug:  budesonide Your last dose was: Your next dose is:    
   
   
 Dose:  500 mcg 500 mcg by Nebulization route two (2) times a day. Refills:  0  
     
   
   
   
  
 THEOPHYLLINE PO Your last dose was: Your next dose is:    
   
   
 Dose:  300 mg Take 300 mg by mouth two (2) times a day. Refills:  0  
     
   
   
   
  
 * Notice: This list has 2 medication(s) that are the same as other medications prescribed for you. Read the directions carefully, and ask your doctor or other care provider to review them with you. Where to Get Your Medications Information on where to get these meds will be given to you by the nurse or doctor. ! Ask your nurse or doctor about these medications  
  oxyCODONE-acetaminophen 5-325 mg per tablet Discharge Instructions DISCHARGE SUMMARY from Nurse The following personal items are in your possession at time of discharge: 
 
Dental Appliances: None Home Medications: None Jewelry: None Clothing: Undergarments, Sweater, Footwear, Shirt, Pants (in locker #11) Other Valuables: Eyeglasses, Wallet, Purse, Cell Phone, Other (comment) (and hearing aids with Cheryl Eddy) PATIENT INSTRUCTIONS: 
 
 
F-face looks uneven A-arms unable to move or move unevenly S-speech slurred or non-existent T-time-call 911 as soon as signs and symptoms begin-DO NOT go Back to bed or wait to see if you get better-TIME IS BRAIN. Warning Signs of HEART ATTACK Call 911 if you have these symptoms: 
? Chest discomfort. Most heart attacks involve discomfort in the center of the chest that lasts more than a few minutes, or that goes away and comes back. It can feel like uncomfortable pressure, squeezing, fullness, or pain. ? Discomfort in other areas of the upper body. Symptoms can include pain or discomfort in one or both arms, the back, neck, jaw, or stomach. ? Shortness of breath with or without chest discomfort. ? Other signs may include breaking out in a cold sweat, nausea, or lightheadedness. Don't wait more than five minutes to call [address]! Fast action can save your life. Calling 911 is almost always the fastest way to get lifesaving treatment. Emergency Medical Services staff can begin treatment when they arrive  up to an hour sooner than if someone gets to the hospital by car. The discharge information has been reviewed with the patient and caregiver. The patient and caregiver verbalized understanding. Discharge medications reviewed with the patient and caregiver and appropriate educational materials and side effects teaching were provided. Patient armband removed and shredded Discharge Orders None Introducing [facility] SERVICES! Dear Bruno: Thank you for requesting a [employer] account. Our records indicate that you already have an active [employer] account. You can access your account anytime at [email or web address] Did you know that you can access your hospital and ER discharge instructions at any time in [employer]? You can also review all of your test results from your hospital stay or ER visit. Additional Information If you have questions, please visit the Frequently Asked Questions section of the [employer] website at [email or web address] Remember, [employer] is NOT to be used for urgent needs. For medical emergencies, dial 911. Now available from your iPhone and Android! General Information Please provide this summary of care documentation to your next provider. Patient Signature:  ____________________________________________________________ Date:  ____________________________________________________________  
  
[facility] Provider Signature:  ____________________________________________________________ Date:  ____________________________________________________________

## 2017-04-21 NOTE — OP NOTES
[address] [place] [address]  OPERATIVE REPORT    Name:  Luna Wayne  MR#:  [id number]  :  1960  Account #:  [de-identified]  Date of Adm:  2017  Date of Surgery:  2017      PREOPERATIVE DIAGNOSIS: Retention of lap band port after band  removal with need for removal due to chronic pain. POSTOPERATIVE DIAGNOSIS: Retention of lap band port after band  removal with need for removal due to chronic pain. PROCEDURES PERFORMED: Laparoscopic band port removal.    SURGEON: Tatiana Chance MD    ASSISTANT: None. ANESTHESIA: Local with sedation. COMPLICATIONS: None. ESTIMATED BLOOD LOSS: None. SPECIMENS REMOVED: Lap-Band and port specimen. STATEMENT OF MEDICAL NECESSITY: The patient is a 55-year-old  female who had a gastric band placed about 10 years ago or more. She did exceptionally well achieving ideal body weight and has  remained that way for the entire time that I have known her. Most  recently, she presented with a small-bowel obstruction and on  exploration, she was noted to have the Lap-Band tubing which was  completely encircling the root of her mesentery causing a complete  obstruction with near ischemia. I went ahead and removed the Lap-  Band and the tubing but since she was not particularly stable during  the operation, we elected to leave the port in place and she was also  still anticoagulated with the Coumadin and we felt like taking the port  off the fascia which can be at times exceptionally problematic and  would not be smart to perform at that operation. We went ahead and  terminated the procedure. She has since recovered well, but now the  port is causing pain. She has no need for it and she is to have this  removed today. DESCRIPTION OF PROCEDURE: The patient was brought to the  operating room, placed on the table in supine position. Some general  sedation was given.  Her abdomen was prepped and the area was  anesthetized with 1% lidocaine and 0.25% Marcaine. I opened up the  prior incision, I then dissected down into the subcutaneous tissue into  the capsule itself. I was able to retrieve a tiny amount of band tubing  which was still traversing her abdominal wall. I then elevated the port  and beginning at the medial aspect, I began to dissect it off of the  fascia which was exceptionally difficult classically through this port. I  was able to free up all 4 hooks of the port and then removed it from the  operative field. I did obtain hemostasis along the port pocket using  Bovie cautery then I closed the skin incisions using 4-0 subcuticular  Monocryl. Steri-Strips and sterile dressings were applied. The patient  tolerated the procedure well.         Adis Grace MD    AT / Richard Nath  D:  04/21/2017   14:25  T:  04/21/2017   17:23  Job #:  [id number]

## 2017-04-21 NOTE — H&P
Lap Band Port Removal - History and Physical    Subjective: The patient is a 55 y.o. obese female with a Body mass index is 27.93 kg/(m^2). She patient is a prior gastric banding patient who had to have her band urgently removed in Dec 2016 due to the tubing causing a bowel obstruction. She recovered well from that incident but now her remaining port is has become painful and is no longer needed as her actual band has been removed.   She wishes to proceed with port removal.      Bariatric comorbidities continue to include:   Patient Active Problem List   Diagnosis Code    Unspecified sleep apnea G47.30    Female stress incontinence N39.3    Asthma J45.909    Small bowel obstruction ([address] K56.69    Acute on chronic respiratory failure with hypoxia ([address] J96.21            Past Medical History:   Diagnosis Date    Adverse effect of anesthesia 2008    drop in BP after lab band surgery    Asthma 11/7/2011    Chronic obstructive pulmonary disease ([address]     Female stress incontinence 11/7/2011    Hypertension     no medication at present    Ill-defined condition     respiratory arrest 1987 permanent tracheostomy    Morbid obesity ([address] 11/7/2011    Thromboembolus ([address] 2003    both legs    Unspecified sleep apnea 11/7/2011    uses ventilator at night     Past Surgical History:   Procedure Laterality Date    HX GI  12/2016    removal of lap band and port    HX HERNIA REPAIR      HX OTHER SURGICAL  2003    [facility]    LAP, PLACE ADJUST GASTR BAND  6/12/2008    NEUROLOGICAL PROCEDURE UNLISTED  1987    brain stem relocation    TRACHEOSTOMY, PLANNED  1987    1)emergency   2)permanent trach      Social History   Substance Use Topics    Smoking status: Never Smoker    Smokeless tobacco: Not on file    Alcohol use No      Family History   Problem Relation Age of Onset    Obesity Mother     Other Father      slightly overweight    Other Brother      normal weight      Current Facility-Administered Medications   Medication Dose Route Frequency Provider Last Rate Last Dose    ceFAZolin (ANCEF) 2g in 100 ml 0.9% NS IVPB  2 g IntraVENous ONCE Alice MD Tony        acetaminophen (OFIRMEV) infusion 1,000 mg  1,000 mg IntraVENous ONCE Alice MD Tony        lactated ringers infusion  125 mL/hr IntraVENous CONTINUOUS Alice MD Tony         Allergies   Allergen Reactions    Claritin [Loratadine] Other (comments)     dizzy    Codeine Nausea Only    Zyrtec [Cetirizine] Other (comments)     dizzy            Review of Symptoms:     General - No history or complaints of unexpected fever, chills, or weight loss  Head/Neck - No history or complaints of headache, diplopia, dysphagia, hearing loss  Cardiac - No history or complaints of chest pain, palpitations, murmur, or shortness of breath  Pulmonary - No history or complaints of shortness of breath, productive cough, hemoptysis  Gastrointestinal - No history or complaints of reflux,  abdominal pain, obstipation/constipation, blood per rectum  Genitourinary - No history or complaints of hematuria/dysuria, stress urinary incontinence symptoms, or renal lithiasis  Musculoskeletal - No history or complaints of joint pain or muscular weakness  Hematologic - No history or complaints of bleeding disorders, blood transfusions, sickle cell anemia  Neurologic - No history or complaints of  migraine headaches, seizure activity, syncopal episodes, TIA or stroke  Integumentary - No history or complaints of rashes, abnormal nevi, skin cancer  Gynecological - unremarkable    Objective:     Visit Vitals    BP (!) 174/109 (BP 1 Location: Left arm, BP Patient Position: At rest;Pre-activity; Sitting)    Pulse 66    Temp 98.4 °F (36.9 °C)    Resp 17    Ht 5' (1.524 m)    Wt 64.9 kg (143 lb)    LMP 06/09/2014    SpO2 98%    BMI 27.93 kg/m2       Physical Examination: General appearance - alert, well appearing, and in no distress  Mental status - alert, oriented to person, place, and time  Eyes - pupils equal and reactive, extraocular eye movements intact  Ears - bilateral TM's and external ear canals normal  Nose - normal and patent, no erythema, discharge or polyps  Mouth - mucous membranes moist, pharynx normal without lesions  Neck - supple, no significant adenopathy  Lymphatics - no palpable lymphadenopathy, no hepatosplenomegaly  Chest - clear to auscultation, no wheezes, rales or rhonchi, symmetric air entry  Heart - normal rate, regular rhythm, normal S1, S2, no murmurs, rubs, clicks or gallops  Abdomen - soft, nontender, nondistended, no masses or organomegaly  Back exam - full range of motion, no tenderness, palpable spasm or pain on motion  Neurological - alert, oriented, normal speech, no focal findings or movement disorder noted  Musculoskeletal - no joint tenderness, deformity or swelling  Extremities - peripheral pulses normal, no pedal edema, no clubbing or cyanosis  Skin - normal coloration and turgor, no rashes, no suspicious skin lesions noted    Labs / Preoperative Evaluations:     Recent Results (from the past 1008 hour(s))   EKG, 12 LEAD, INITIAL    Collection Time: 03/28/17  2:20 PM   Result Value Ref Range    Ventricular Rate 65 BPM    Atrial Rate 65 BPM    P-R Interval 144 ms    QRS Duration 138 ms    Q-T Interval 434 ms    QTC Calculation (Bezet) 451 ms    Calculated P Axis 72 degrees    Calculated R Axis 50 degrees    Calculated T Axis 55 degrees    Diagnosis       Normal sinus rhythm  Right atrial enlargement  Right bundle branch block  Abnormal ECG  No previous ECGs available  Confirmed by Александр Padgett MD. ([id number]) on 3/29/2017 1:05:24 AM     CBC WITH AUTOMATED DIFF    Collection Time: 03/28/17  2:23 PM   Result Value Ref Range    WBC 6.2 4.6 - 13.2 K/uL    RBC 4.53 4.20 - 5.30 M/uL    HGB 13.2 12.0 - 16.0 g/dL    HCT 40.6 35.0 - 45.0 %    MCV 89.6 74.0 - 97.0 FL    MCH 29.1 24.0 - 34.0 PG    MCHC 32.5 31.0 - 37.0 g/dL    RDW 14.9 (H) 11.6 - 14.5 %    PLATELET 358 387 - 441 K/uL    MPV 10.4 9.2 - 11.8 FL    NEUTROPHILS 64 40 - 73 %    LYMPHOCYTES 21 21 - 52 %    MONOCYTES 7 3 - 10 %    EOSINOPHILS 7 (H) 0 - 5 %    BASOPHILS 1 0 - 2 %    ABS. NEUTROPHILS 4.0 1.8 - 8.0 K/UL    ABS. LYMPHOCYTES 1.3 0.9 - 3.6 K/UL    ABS. MONOCYTES 0.5 0.05 - 1.2 K/UL    ABS. EOSINOPHILS 0.4 0.0 - 0.4 K/UL    ABS. BASOPHILS 0.1 (H) 0.0 - 0.06 K/UL    DF AUTOMATED     METABOLIC PANEL, COMPREHENSIVE    Collection Time: 03/28/17  2:23 PM   Result Value Ref Range    Sodium 145 136 - 145 mmol/L    Potassium 4.2 3.5 - 5.5 mmol/L    Chloride 107 100 - 108 mmol/L    CO2 33 (H) 21 - 32 mmol/L    Anion gap 5 3.0 - 18 mmol/L    Glucose 86 74 - 99 mg/dL    BUN 23 (H) 7.0 - 18 MG/DL    Creatinine 0.63 0.6 - 1.3 MG/DL    BUN/Creatinine ratio 37 (H) 12 - 20      GFR est AA >60 >60 ml/min/1.73m2    GFR est non-AA >60 >60 ml/min/1.73m2    Calcium 9.2 8.5 - 10.1 MG/DL    Bilirubin, total 0.3 0.2 - 1.0 MG/DL    ALT (SGPT) 37 13 - 56 U/L    AST (SGOT) 25 15 - 37 U/L    Alk. phosphatase 48 45 - 117 U/L    Protein, total 6.7 6.4 - 8.2 g/dL    Albumin 3.4 3.4 - 5.0 g/dL    Globulin 3.3 2.0 - 4.0 g/dL    A-G Ratio 1.0 0.8 - 1.7         Assessment:     Prior gastric banding patient with port still in place following urgent band / tubing removal 12/2016    Plan:     Port removal    She understands the need for surgical intervention to remove her port. She understands the risk associated with port removal include but are not limited to; death, DVT/PE, bleeding, infection, and damage to surrounding structures.       Signed By: DEBORA Neal     April 21, 2017

## 2017-04-26 NOTE — ANESTHESIA POSTPROCEDURE EVALUATION
Post-Anesthesia Evaluation & Assessment    Visit Vitals    /90    Pulse 76    Temp 36.6 °C (97.8 °F)    Resp 14    Ht 5' (1.524 m)    Wt 64.9 kg (143 lb)    SpO2 96%    BMI 27.93 kg/m2       No untreated/active PONV    Post-operative hydration adequate. Adequate post-operative analgesia per PACU discharge criteria    Mental status & level of consciousness: alert and oriented x 3    Respiratory status: patent unassisted airway     No apparent anesthetic complications requiring additional post-anesthetic care    Patient has met all discharge requirements.             Eunice Jeffery MD

## 2017-05-05 ENCOUNTER — OFFICE VISIT (OUTPATIENT)
Dept: SURGERY | Age: 57
End: 2017-05-05

## 2017-05-05 VITALS
BODY MASS INDEX: 28.62 KG/M2 | HEART RATE: 78 BPM | DIASTOLIC BLOOD PRESSURE: 98 MMHG | OXYGEN SATURATION: 97 % | SYSTOLIC BLOOD PRESSURE: 159 MMHG | WEIGHT: 145.8 LBS | HEIGHT: 60 IN

## 2017-05-05 DIAGNOSIS — R03.0 ELEVATED BLOOD PRESSURE READING: ICD-10-CM

## 2017-05-05 DIAGNOSIS — E66.3 OVERWEIGHT (BMI 25.0-29.9): Primary | ICD-10-CM

## 2017-05-05 DIAGNOSIS — R42 LIGHTHEADEDNESS: ICD-10-CM

## 2017-05-05 DIAGNOSIS — Z79.01 ANTICOAGULATED ON COUMADIN: ICD-10-CM

## 2017-05-05 NOTE — PROGRESS NOTES
Subjective:     Prasanna Escobedo  is a 55 y.o. female who presents for follow-up about 2 weeks following lap band port removal.  Her lap band & tubing were rmoved 12/2/16 due to small bowel obstruction. She has gained 2 pounds since surgery. Body mass index is 28.47 kg/(m^2). .    Surgery related complication: NA. She is tolerating liquids & solids without difficulty and denies vomiting, abdominal pain and pain from her previous port site. Fluid intake:good  Protein intake:good  Taking prescribed multivitamin. The patient's activity level: not active. Changes in her medical history and medications have been reviewed:  Pt does report episode lightheadedness, ? Fainting on the day after her surgery at her home after a hot shower. No injury. Was able to continue with her daily activities. No further episodes. Has been under care of her PCP previously for occasional epsiodes lightheadedness and low BP. Currently not on any HBP rx.     Patient Active Problem List   Diagnosis Code    Unspecified sleep apnea G47.30    Female stress incontinence N39.3    Asthma J45.909    Small bowel obstruction ([address] K56.69    Acute on chronic respiratory failure with hypoxia ([address] J96.21     Past Medical History:   Diagnosis Date    Adverse effect of anesthesia 2008    drop in BP after lab band surgery    Asthma 11/7/2011    Chronic obstructive pulmonary disease ([address]     Female stress incontinence 11/7/2011    Hypertension     no medication at present    Ill-defined condition     respiratory arrest 1987 permanent tracheostomy    Morbid obesity ([address] 11/7/2011    Thromboembolus ([address] 2003    both legs    Unspecified sleep apnea 11/7/2011    uses ventilator at night     Past Surgical History:   Procedure Laterality Date    HX GI  12/2016    removal of lap band and port    HX HERNIA REPAIR      HX OTHER SURGICAL  2003    [facility]    LAP, PLACE ADJUST GASTR BAND  6/12/2008    NEUROLOGICAL PROCEDURE UNLISTED  1987    brain stem relocation    TRACHEOSTOMY, PLANNED  1987    1)emergency   2)permanent trach     Current Outpatient Prescriptions   Medication Sig Dispense Refill    ACETAMINOPHEN (TYLENOL 8 HOUR PO) Take  by mouth.  warfarin (COUMADIN) 7.5 mg tablet Take 7.5 mg by mouth four (4) days a week.  budesonide (PULMICORT) 0.5 mg/2 mL nbsp 500 mcg by Nebulization route two (2) times a day.  fluticasone (FLONASE) 50 mcg/actuation nasal spray 2 Sprays by Both Nostrils route daily.  alendronate (FOSAMAX) 70 mg tablet Take 70 mg by mouth every seven (7) days.  fexofenadine (ALLEGRA) 180 mg tablet Take  by mouth daily.  CALCIUM CARBONATE PO Take 600 mg by mouth two (2) times a day.  BIOTIN PO Take 10,000 mcg by mouth daily.  multivitamin (ONE A DAY) tablet Take 1 Tab by mouth daily.  VITAMIN B COMPLEX (B COMPLEX PO) Take  by mouth.  PSYLLIUM SEED, WITH SUGAR, (METAMUCIL PO) Take  by mouth.  POLYETHYLENE GLYCOL 3350 (MIRALAX PO) Take  by mouth.  peg 400-propylene glycol (SYSTANE) 0.4-0.3 % drop Administer 2 Drops to both eyes daily.  OXYGEN-AIR DELIVERY SYSTEMS by Does Not Apply route. 2 L O2 fed into ventilator uses at night      THEOPHYLLINE ANHYDROUS (THEOPHYLLINE PO) Take 300 mg by mouth two (2) times a day.  WARFARIN SODIUM (COUMADIN PO) Take 5 mg by mouth Q TU, TH & SAT. Objective:     Visit Vitals    BP (!) 159/98 (BP 1 Location: Left arm, BP Patient Position: Sitting)    Pulse 78    Ht 5' (1.524 m)    Wt 66.1 kg (145 lb 12.8 oz)    LMP 06/09/2014    SpO2 97%  Comment: Tracheotomy    BMI 28.47 kg/m2        Physical Exam: Pt also seen by Dr. Desirae Olivo:  alert, cooperative, no distress, appears stated age. Permanent trach in place. Abdomen:   abdomen is soft without significant tenderness, masses, organomegaly or guarding; Active bowel sounds all 4 quadrants. No hernia present.    Incisions: healing well Assessment:     1. History of Morbid obesity, status post  port removal. Doing well. 2.  Anticoagulated on coumadin  3. Elevated BP - currently not on rx. 4.  Lightheadedness - episode x1. Pt also discussed with Dr. Yadi Varela who thought it may have been due to decreased hydration the day before and day of sx, vasodilation after shower    Plan:     1. To continue focus on hydration. 2. May continue solid phase diet. Reminded to measure portions, continue high protein, low carbohydrate diet. Reminded to eat regularly, to eat slowly & not to drink with meals. Discussed with pt. Pt verbalized understanding. 3.   Reminded about importance of taking vitamin supplements. 4.  Discussed her episode lightheadedness & possible causes. To decrease amt of time in shower. To increase hydration. Safety stressed. To increase activity as able. 5.  Discussed her elevated BP readings. To continue current rx. To follow-up with PCP about BP. To continue to follow-up for INR checks. Pt verbalized understanding. 6.  Encouraged attendance @ support group  7. I have discussed this plan and education material with patient. Pt verbalized understanding. 8.   To follow-up on as needed basis in office, but to call if any questions/concerns. Ms. Brian Angel has a reminder for a \"due or due soon\" health maintenance. I have asked that she contact her primary care provider for follow-up on this health maintenance.

## 2017-05-05 NOTE — PATIENT INSTRUCTIONS
[address] [facility]    1. Stay hydrated  2. Focus on protein  3. Eat regularly  4. Take your vitamins  5. Be active               Body Mass Index: Care Instructions  Your Care Instructions    Body mass index (BMI) can help you see if your weight is raising your risk for health problems. It uses a formula to compare how much you weigh with how tall you are. · A BMI lower than 18.5 is considered underweight. · A BMI between 18.5 and 24.9 is considered healthy. · A BMI between 25 and 29.9 is considered overweight. A BMI of 30 or higher is considered obese. If your BMI is in the normal range, it means that you have a lower risk for weight-related health problems. If your BMI is in the overweight or obese range, you may be at increased risk for weight-related health problems, such as high blood pressure, heart disease, stroke, arthritis or joint pain, and diabetes. If your BMI is in the underweight range, you may be at increased risk for health problems such as fatigue, lower protection (immunity) against illness, muscle loss, bone loss, hair loss, and hormone problems. BMI is just one measure of your risk for weight-related health problems. You may be at higher risk for health problems if you are not active, you eat an unhealthy diet, or you drink too much alcohol or use tobacco products. Follow-up care is a key part of your treatment and safety. Be sure to make and go to all appointments, and call your doctor if you are having problems. It's also a good idea to know your test results and keep a list of the medicines you take. How can you care for yourself at home? · Practice healthy eating habits. This includes eating plenty of fruits, vegetables, whole grains, lean protein, and low-fat dairy. · If your doctor recommends it, get more exercise. Walking is a good choice. Bit by bit, increase the amount you walk every day. Try for at least 30 minutes on most days of the week. · Do not smoke.  Smoking can increase your risk for health problems. If you need help quitting, talk to your doctor about stop-smoking programs and medicines. These can increase your chances of quitting for good. · Limit alcohol to 2 drinks a day for men and 1 drink a day for women. Too much alcohol can cause health problems. If you have a BMI higher than 25  · Your doctor may do other tests to check your risk for weight-related health problems. This may include measuring the distance around your waist. A waist measurement of more than 40 inches in men or 35 inches in women can increase the risk of weight-related health problems. · Talk with your doctor about steps you can take to stay healthy or improve your health. You may need to make lifestyle changes to lose weight and stay healthy, such as changing your diet and getting regular exercise. If you have a BMI lower than 18.5  · Your doctor may do other tests to check your risk for health problems. · Talk with your doctor about steps you can take to stay healthy or improve your health. You may need to make lifestyle changes to gain or maintain weight and stay healthy, such as getting more healthy foods in your diet and doing exercises to build muscle. Where can you learn more? Go to http://miguel-tiffany.info/. Enter S176 in the search box to learn more about \"Body Mass Index: Care Instructions. \"  Current as of: January 23, 2017  Content Version: 11.2  © 4545-8661 [employer]. Care instructions adapted under license by [employer] (which disclaims liability or warranty for this information). If you have questions about a medical condition or this instruction, always ask your healthcare professional. [place] [address] any warranty or liability for your use of this information.

## 2017-07-11 ENCOUNTER — TELEPHONE (OUTPATIENT)
Dept: SURGERY | Age: 57
End: 2017-07-11

## 2017-07-11 NOTE — TELEPHONE ENCOUNTER
[facility]  Phone call placed. Spoke to patient. Patient denies any readmissions,complications or ED visits within the 30 day postoperative period.

## (undated) DEVICE — MASTISOL ADHESIVE LIQ 2/3ML

## (undated) DEVICE — PREP SKN PREVAIL 40ML APPL --

## (undated) DEVICE — STERILE POLYISOPRENE POWDER-FREE SURGICAL GLOVES: Brand: PROTEXIS

## (undated) DEVICE — REM POLYHESIVE ADULT PATIENT RETURN ELECTRODE: Brand: VALLEYLAB

## (undated) DEVICE — DEVON™ KNEE AND BODY STRAP 60" X 3" (1.5 M X 7.6 CM): Brand: DEVON

## (undated) DEVICE — KENDALL SCD EXPRESS SLEEVES, KNEE LENGTH, MEDIUM: Brand: KENDALL SCD

## (undated) DEVICE — 3M™ STERI-STRIP™ REINFORCED ADHESIVE SKIN CLOSURES, R1548, 1 IN X 5 IN (25 MM X 125 MM), 4 STRIPS/ENVELOPE: Brand: 3M™ STERI-STRIP™

## (undated) DEVICE — (D)PACK STD BSHR BARIATRIC -- DISC BY MFR USE ITEM 338832

## (undated) DEVICE — SUT MONOCRYL PLUS UD 4-0 --

## (undated) DEVICE — SOL IRRIGATION INJ NACL 0.9% 500ML BTL